# Patient Record
Sex: MALE | Race: OTHER | Employment: OTHER | URBAN - METROPOLITAN AREA
[De-identification: names, ages, dates, MRNs, and addresses within clinical notes are randomized per-mention and may not be internally consistent; named-entity substitution may affect disease eponyms.]

---

## 2017-01-04 NOTE — PATIENT DISCUSSION
(K90.946) Vitreous degeneration, bilateral - Assesment : Examination revealed PVD. No changes reported. - Plan : Monitor for changes. Advised patient to call our office with decreased vision or an increase in flashes and/or floaters.

## 2017-01-04 NOTE — PATIENT DISCUSSION
(E11.9) Type 2 diabetes mellitus without complications - Assesment : Examination reveals Pt is diabetic with no ocular complications. - Plan : Continue care with PCP. Monitor blood sugar and A1C levels. Recommended yearly dilated eye exams to monitor for ocular changes. Letter sent to PCP with today's findings.

## 2017-01-04 NOTE — PATIENT DISCUSSION
(H35.363) Drusen (degenerative) of macula, bilateral - Assesment : Examination revealed Drusen. Baseline Mac OCT today. - Plan : Monitor for changes. Advised patient to eat healthy and wear sunglasses. RTC in 1 year for Exam and Mac OCT, sooner if problems or changes occur.

## 2017-01-04 NOTE — PATIENT DISCUSSION
(H25.13) Age-related nuclear cataract, bilateral - Assesment : Examination revealed cataract. Mild. - Plan : Monitor for changes. Advised patient to call our office with decreased vision or increased symptoms. Updated GLRx given today.

## 2022-03-11 ENCOUNTER — COMPREHENSIVE EXAM (OUTPATIENT)
Dept: URBAN - METROPOLITAN AREA CLINIC 36 | Facility: CLINIC | Age: 72
End: 2022-03-11

## 2022-03-11 DIAGNOSIS — H25.811: ICD-10-CM

## 2022-03-11 DIAGNOSIS — H25.812: ICD-10-CM

## 2022-03-11 DIAGNOSIS — H43.811: ICD-10-CM

## 2022-03-11 DIAGNOSIS — H04.123: ICD-10-CM

## 2022-03-11 DIAGNOSIS — H52.7: ICD-10-CM

## 2022-03-11 PROCEDURE — 92015 DETERMINE REFRACTIVE STATE: CPT

## 2022-03-11 PROCEDURE — 92004 COMPRE OPH EXAM NEW PT 1/>: CPT

## 2022-03-11 ASSESSMENT — VISUAL ACUITY
OS_SC: 20/40-1
OD_SC: J8
OD_SC: 20/30-1
OS_SC: J4

## 2022-03-11 ASSESSMENT — TONOMETRY
OS_IOP_MMHG: 17
OD_IOP_MMHG: 16

## 2024-11-21 ENCOUNTER — COMPREHENSIVE EXAM (OUTPATIENT)
Dept: URBAN - METROPOLITAN AREA CLINIC 36 | Facility: CLINIC | Age: 74
End: 2024-11-21

## 2024-11-21 DIAGNOSIS — H25.813: ICD-10-CM

## 2024-11-21 DIAGNOSIS — H52.7: ICD-10-CM

## 2024-11-21 DIAGNOSIS — H04.123: ICD-10-CM

## 2024-11-21 DIAGNOSIS — H43.813: ICD-10-CM

## 2024-11-21 PROCEDURE — 92015 DETERMINE REFRACTIVE STATE: CPT

## 2024-11-21 PROCEDURE — 92014 COMPRE OPH EXAM EST PT 1/>: CPT
